# Patient Record
Sex: FEMALE | Race: WHITE | NOT HISPANIC OR LATINO | URBAN - METROPOLITAN AREA
[De-identification: names, ages, dates, MRNs, and addresses within clinical notes are randomized per-mention and may not be internally consistent; named-entity substitution may affect disease eponyms.]

---

## 2020-12-02 ENCOUNTER — EMERGENCY (EMERGENCY)
Facility: HOSPITAL | Age: 25
LOS: 1 days | Discharge: HOME | End: 2020-12-02
Attending: EMERGENCY MEDICINE
Payer: MEDICAID

## 2020-12-02 VITALS
DIASTOLIC BLOOD PRESSURE: 85 MMHG | HEIGHT: 69 IN | HEART RATE: 81 BPM | TEMPERATURE: 99 F | OXYGEN SATURATION: 99 % | WEIGHT: 244.05 LBS | SYSTOLIC BLOOD PRESSURE: 131 MMHG | RESPIRATION RATE: 18 BRPM

## 2020-12-02 DIAGNOSIS — Z90.89 ACQUIRED ABSENCE OF OTHER ORGANS: Chronic | ICD-10-CM

## 2020-12-02 DIAGNOSIS — Z98.890 OTHER SPECIFIED POSTPROCEDURAL STATES: Chronic | ICD-10-CM

## 2020-12-02 LAB — HCG UR QL: NEGATIVE — SIGNIFICANT CHANGE UP

## 2020-12-02 PROCEDURE — 81025 URINE PREGNANCY TEST: CPT

## 2020-12-02 PROCEDURE — 99284 EMERGENCY DEPT VISIT MOD MDM: CPT | Mod: 25

## 2020-12-02 PROCEDURE — 70450 CT HEAD/BRAIN W/O DYE: CPT

## 2020-12-02 PROCEDURE — 70450 CT HEAD/BRAIN W/O DYE: CPT | Mod: 26

## 2020-12-02 PROCEDURE — 99284 EMERGENCY DEPT VISIT MOD MDM: CPT

## 2020-12-02 RX ORDER — ONDANSETRON 8 MG/1
4 TABLET, FILM COATED ORAL ONCE
Refills: 0 | Status: COMPLETED | OUTPATIENT
Start: 2020-12-02 | End: 2020-12-02

## 2020-12-02 RX ORDER — IBUPROFEN 200 MG
600 TABLET ORAL ONCE
Refills: 0 | Status: COMPLETED | OUTPATIENT
Start: 2020-12-02 | End: 2020-12-02

## 2020-12-02 RX ORDER — ONDANSETRON 8 MG/1
1 TABLET, FILM COATED ORAL
Qty: 15 | Refills: 0
Start: 2020-12-02 | End: 2020-12-06

## 2020-12-02 RX ADMIN — Medication 600 MILLIGRAM(S): at 22:11

## 2020-12-02 RX ADMIN — ONDANSETRON 4 MILLIGRAM(S): 8 TABLET, FILM COATED ORAL at 20:04

## 2020-12-02 RX ADMIN — ONDANSETRON 4 MILLIGRAM(S): 8 TABLET, FILM COATED ORAL at 22:17

## 2020-12-02 RX ADMIN — Medication 600 MILLIGRAM(S): at 21:53

## 2020-12-02 NOTE — ED PROVIDER NOTE - OBJECTIVE STATEMENT
25y F with hx of anxiety coming in after hitting her head at 5pm tonight. She was bent over picking up something at work. She then struck her forehead on a metal shelf when she was coming back upright. Denies loss of consciousness. She was dizzy, sweaty and nauseous after the trauma which resolved after 30 minutes. She did not vomit. She felt a bump on her forehead and said she was pale. Endorses light sensitivity. She was able to ambulate and went to an urgent care who sent her to the ED. Denies weakness. Still complains of headache and nausea.

## 2020-12-02 NOTE — ED PROVIDER NOTE - PATIENT PORTAL LINK FT
You can access the FollowMyHealth Patient Portal offered by [facility] by registering at the following website: [email or web address]. By joining [employer]s FollowMyHealth portal, you will also be able to view your health information using other applications (apps) compatible with our system.

## 2020-12-02 NOTE — ED PROVIDER NOTE - CLINICAL SUMMARY MEDICAL DECISION MAKING FREE TEXT BOX
25y F with hx of anxiety here for head trauma at 5pm from hitting her head on a metal shelf at work. Currently complains of nausea and headache.  Neuro exam was normal. Will get non-contrast CT head. Zofran for nausea.

## 2020-12-02 NOTE — ED PROVIDER NOTE - CHPI ED SYMPTOMS NEG
no weakness/no confusion/no seizure/no loss of consciousness/no syncope/no vomiting/no change in level of consciousness

## 2020-12-02 NOTE — ED PROVIDER NOTE - ATTENDING CONTRIBUTION TO CARE
25 y.o. female LMP? pt has IUD, BIBA pt claims she accidently hit her mid forehead as she was getting up from a bending down position, pt with lightheadedness, nausea, HA, blurred vision, no LOC.  Pt went to , sent to ED.  PE: in mild NAD, EOMI, no nystagmus, visual acuity- 20/13, Head- mid forehead- tenderness to palp., no hematoma, Heart- [id number], Lungs- clear, abd- soft, NT, Ext- no C/E/C, neuro- no ataxia, sensory intact.  Pt with head injury, will get CT head

## 2023-12-05 NOTE — ED PROVIDER NOTE - GASTROINTESTINAL NEGATIVE STATEMENT, MLM
Chronic HFrEF (heart failure with reduced ejection fraction) no abdominal pain, no bloating, no constipation, no diarrhea, no nausea and no vomiting.